# Patient Record
(demographics unavailable — no encounter records)

---

## 2025-02-28 NOTE — ASSESSMENT
[FreeTextEntry1] : Pt takes his carbidopa-levodopa once daily. He is likely undermedicated. I recommend optimization of Parkinson's medications prior to neurosurgical intervention. Can continue to f/u with Dr. Galaviz, will also refer to movement disorder neurologist.   PLAN: -Referral to movement disorder neurologist   I, Dr. Chandu Moreau, personally performed the evaluation and management (E/M) services for this new patient.  That E/M includes conducting the clinically appropriate initial history &/or exam, assessing all conditions, and establishing the plan of care.  Today, my DIMITRY, [employer], was here to observe my evaluation and management service for this patient & follow plan of care established by me going forward.

## 2025-02-28 NOTE — ASSESSMENT
[FreeTextEntry1] : Pt takes his carbidopa-levodopa once daily. He is likely undermedicated. I recommend optimization of Parkinson's medications prior to neurosurgical intervention. Can continue to f/u with Dr. Galaviz, will also refer to movement disorder neurologist.   PLAN: -Referral to movement disorder neurologist   I, Dr. Chandu Moreau, personally performed the evaluation and management (E/M) services for this new patient.  That E/M includes conducting the clinically appropriate initial history &/or exam, assessing all conditions, and establishing the plan of care.  Today, my DIMITRY, [employer], was here to observe my evaluation and management service for this patient & follow plan of care established by me going forward.

## 2025-02-28 NOTE — HISTORY OF PRESENT ILLNESS
[de-identified] : AUSTIN JASSO is a 66 year old right handed male with PMH of Parkinson's disease, DVT s/p IVC filter, on Eliquis, HLD, prostate ca, gout, short term memory loss. He presents to the office for neurosurgical consultation for  deep brain stimulation. He is under the care of neurologist Dr. Galaviz. He was diagnosed with PD 3 years ago. His symptoms are right hand tremor, slowness R>L, and short term memory problems. His tremor does not respond well to the Sinemet. Tremor severity increased the last year. Brushing his teeth is hard due to tremor and slowness, as well as washing himself. He doesn't do much physical work since tremor started because it was so difficult. He does not work currently.  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   Carbidopa-levodopa (pt unsure of dosage) takes once daily

## 2025-02-28 NOTE — CONSULT LETTER
[Dear  ___] : Dear  [unfilled], [Consult Letter:] : I had the pleasure of evaluating your patient, [unfilled]. [( Thank you for referring [unfilled] for consultation for _____ )] : Thank you for referring [unfilled] for consultation for [unfilled] [Please see my note below.] : Please see my note below. [Consult Closing:] : Thank you very much for allowing me to participate in the care of this patient.  If you have any questions, please do not hesitate to contact me. [Sincerely,] : Sincerely, [FreeTextEntry3] : Chandu Moreau MD Director, Functional Neurosurgery Deep Brain Stimulation/Focused Ultrasound Program [facility] Neurosurgery [address], [address] [place], [place] [address] P [phone]  F [phone]

## 2025-02-28 NOTE — HISTORY OF PRESENT ILLNESS
[de-identified] : AUSTIN JASSO is a 66 year old right handed male with PMH of Parkinson's disease, DVT s/p IVC filter, on Eliquis, HLD, prostate ca, gout, short term memory loss. He presents to the office for neurosurgical consultation for  deep brain stimulation. He is under the care of neurologist Dr. Galaviz. He was diagnosed with PD 3 years ago. His symptoms are right hand tremor, slowness R>L, and short term memory problems. His tremor does not respond well to the Sinemet. Tremor severity increased the last year. Brushing his teeth is hard due to tremor and slowness, as well as washing himself. He doesn't do much physical work since tremor started because it was so difficult. He does not work currently.  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   Carbidopa-levodopa (pt unsure of dosage) takes once daily

## 2025-02-28 NOTE — REASON FOR VISIT
[New Patient Visit] : a new patient visit [Language Line ] : provided by [employer]   [Interpreters_IDNumber] : [id number] [Interpreters_FullName] : Abigail [TWNoteComboBox1] : Citizen of [place]

## 2025-02-28 NOTE — HISTORY OF PRESENT ILLNESS
[de-identified] : AUSTIN JASSO is a 66 year old right handed male with PMH of Parkinson's disease, DVT s/p IVC filter, on Eliquis, HLD, prostate ca, gout, short term memory loss. He presents to the office for neurosurgical consultation for  deep brain stimulation. He is under the care of neurologist Dr. Galaviz. He was diagnosed with PD 3 years ago. His symptoms are right hand tremor, slowness R>L, and short term memory problems. His tremor does not respond well to the Sinemet. Tremor severity increased the last year. Brushing his teeth is hard due to tremor and slowness, as well as washing himself. He doesn't do much physical work since tremor started because it was so difficult. He does not work currently.  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   Carbidopa-levodopa (pt unsure of dosage) takes once daily

## 2025-02-28 NOTE — REASON FOR VISIT
[New Patient Visit] : a new patient visit [Language Line ] : provided by [employer]   [Interpreters_IDNumber] : [id number] [Interpreters_FullName] : Abigail [TWNoteComboBox1] : [place]onese

## 2025-02-28 NOTE — CONSULT LETTER
[Dear  ___] : Dear  [unfilled], [Consult Letter:] : I had the pleasure of evaluating your patient, [unfilled]. [( Thank you for referring [unfilled] for consultation for _____ )] : Thank you for referring [unfilled] for consultation for [unfilled] [Please see my note below.] : Please see my note below. [Consult Closing:] : Thank you very much for allowing me to participate in the care of this patient.  If you have any questions, please do not hesitate to contact me. [Sincerely,] : Sincerely, [FreeTextEntry3] : Chandu Moreau MD Director, Functional Neurosurgery Deep Brain Stimulation/Focused Ultrasound Program [facility] Neurosurgery [address], [address] [place], [place] [address] P [phone]  F [phone]

## 2025-02-28 NOTE — CONSULT LETTER
[Dear  ___] : Dear  [unfilled], [Consult Letter:] : I had the pleasure of evaluating your patient, [unfilled]. [( Thank you for referring [unfilled] for consultation for _____ )] : Thank you for referring [unfilled] for consultation for [unfilled] [Please see my note below.] : Please see my note below. [Consult Closing:] : Thank you very much for allowing me to participate in the care of this patient.  If you have any questions, please do not hesitate to contact me. [Sincerely,] : Sincerely, [FreeTextEntry3] : Chandu Moreau MD Director, Functional Neurosurgery Deep Brain Stimulation/Focused Ultrasound Program [facility] Neurosurgery [address], [address] [place], [place] [address] P [phone]  F [phone]

## 2025-02-28 NOTE — PHYSICAL EXAM
[General Appearance - Alert] : alert [General Appearance - In No Acute Distress] : in no acute distress [Oriented To Time, Place, And Person] : oriented to person, place, and time [Person] : oriented to person [Place] : oriented to place [Time] : oriented to time [Cranial Nerves Oculomotor (III)] : extraocular motion intact [Cranial Nerves Trigeminal (V)] : facial sensation intact symmetrically [Cranial Nerves Facial (VII)] : face symmetrical [Cranial Nerves Accessory (XI - Cranial And Spinal)] : head turning and shoulder shrug symmetric [Cranial Nerves Hypoglossal (XII)] : there was no tongue deviation with protrusion [Motor Strength] : muscle strength was normal in all four extremities [Motor Handedness Right-Handed] : the patient is right hand dominant [Sensation Tactile Decrease] : light touch was intact [Sclera] : the sclera and conjunctiva were normal [Outer Ear] : the ears and nose were normal in appearance [Neck Appearance] : the appearance of the neck was normal [] : no respiratory distress [Respiration, Rhythm And Depth] : normal respiratory rhythm and effort [Skin Color & Pigmentation] : normal skin color and pigmentation [FreeTextEntry8] : right rest tremor, no postural or action tremor. Mild rigidity RUE, bradykinesia RUE

## 2025-02-28 NOTE — ASSESSMENT
[FreeTextEntry1] : Pt takes his carbidopa-levodopa once daily. He is likely undermedicated. I recommend optimization of Parkinson's medications prior to neurosurgical intervention. Can continue to f/u with Dr. Galaviz, will also refer to movement disorder neurologist.   PLAN: -Referral to movement disorder neurologist   I, Dr. Chandu Moreau, personally performed the evaluation and management (E/M) services for this new patient.  That E/M includes conducting the clinically appropriate initial history &/or exam, assessing all conditions, and establishing the plan of care.  Today, my DIMITRY, [employer], was here to observe my evaluation and management service for this patient & follow plan of care established by me going forward.

## 2025-02-28 NOTE — REASON FOR VISIT
[New Patient Visit] : a new patient visit [Language Line ] : provided by [employer]   [Interpreters_IDNumber] : [id number] [Interpreters_FullName] : Abigail [TWNoteComboBox1] : Congolese

## 2025-03-05 NOTE — REASON FOR VISIT
[Initial Eval - Existing Diagnosis] : an initial evaluation of an existing diagnosis [Language Line ] : provided by [employer]   [TWNoteComboBox1] : [place]

## 2025-03-05 NOTE — REASON FOR VISIT
[Initial Eval - Existing Diagnosis] : an initial evaluation of an existing diagnosis [Language Line ] : provided by [employer]   [TWNoteComboBox1] : [place]ian

## 2025-03-05 NOTE — PHYSICAL EXAM
[General Appearance - Alert] : alert [Oriented To Time, Place, And Person] : oriented to person, place, and time [Cranial Nerves Oculomotor (III)] : extraocular motion intact [FreeTextEntry1] : +1 masking Vertical eye movements intact without square wave jerks normal speech volume  +2 RUE Rigidity  +2 RUE +4 constancy Resting tremor +1 RUE Action tremor +1 RUE Postural tremor  +2 RUE>LUE Bradykinesia with finger tapping, hand supination/pronation, foot tapping, foot stomping. No dysmetria with finger to nose Gait: able to stand with hands crossed and without assistance +1 posture normal gait initiation, normal stride length, reduced right arm swing, no freezing of gait upon turning, requires multiple steps with turning. negative pull test

## 2025-03-05 NOTE — HISTORY OF PRESENT ILLNESS
[FreeTextEntry1] : 66 year old male presents for consultation Patient was diagnosed with PD 3 years ago by Dr Galaviz. His initial symptoms were memory changes, slower movements and tremor in the right arm 5 years ago. Patient was started on sinemet 1 tab TID but developed heartburn and some nausea so he reduced it to qd. He took it TID for 3 months but noticed no improvement in his tremor, this was 2 years ago. In the last year he notes tremors are more constant and intense. He notes the tremors made him unable to work. 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 Reports some tremor interference with shaving and holding a spoon. Reports he is slower with showering and dressing.   Patient states he saw an ophthalmologist yesterday and diagnosed him with narrow angle glaucoma and cataracts  Denies problems with walking or balance. Denies recent falls. PMHx Social: lives with wife. Immigrated in 2016   Nonmotor: Cognitive: reports he forgets about appointment times. needs to write everything down. stopped driving 5 years ago not due to medical condition.  Denies VH  Wife tells him he talks and fights in his sleep. denies sialorrhea  no issues with swallowing  Family Hx: mother, brother and sister have hand tremors. only brother has been PD diagnosed  Current medications Memantine qd   Past meds Carbidopa/levodopa 25/100: heartburn and nausea

## 2025-03-05 NOTE — DISCUSSION/SUMMARY
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 He tried sinemet 25/100 but it did not provide benefit and IR formulation causes heartburn. His tremors and bradykinesia in the RUE are causing him to complete ADLs slower than normal. He reports memory changes like forgetfulness, no confusion or episodes of disorientation. Denies VH  Patient was counseled on the following recommendations  amantadine contraindicated due to glaucoma  Initiate trial of artane 1/2 tablet BID. AEs discussed Willl consider trial of Sinemet ER at follow up  f[place]u [place] 5 [place]